# Patient Record
Sex: FEMALE | URBAN - METROPOLITAN AREA
[De-identification: names, ages, dates, MRNs, and addresses within clinical notes are randomized per-mention and may not be internally consistent; named-entity substitution may affect disease eponyms.]

---

## 2020-11-16 ENCOUNTER — OFFICE VISIT (OUTPATIENT)
Dept: URBAN - METROPOLITAN AREA CLINIC 27 | Facility: CLINIC | Age: 60
End: 2020-11-16
Payer: COMMERCIAL

## 2020-11-16 DIAGNOSIS — H43.813 VITREOUS DEGENERATION, BILATERAL: ICD-10-CM

## 2020-11-16 DIAGNOSIS — H33.311 HORSESHOE TEAR OF RETINA WITHOUT DETACHMENT, RIGHT EYE: ICD-10-CM

## 2020-11-16 DIAGNOSIS — Z96.1 PRESENCE OF INTRAOCULAR LENS: ICD-10-CM

## 2020-11-16 PROCEDURE — 92014 COMPRE OPH EXAM EST PT 1/>: CPT | Performed by: OPHTHALMOLOGY

## 2020-11-16 PROCEDURE — 92134 CPTRZ OPH DX IMG PST SGM RTA: CPT | Performed by: OPHTHALMOLOGY

## 2020-11-16 ASSESSMENT — INTRAOCULAR PRESSURE
OD: 14
OS: 16

## 2020-11-16 NOTE — IMPRESSION/PLAN
Impression: ERM OU Plan: Exam/OCT show a stable moderate ERM/LH OD, mild ERM/LH OS. VA stable, observe. 

6 months, OCT OU

## 2020-11-16 NOTE — IMPRESSION/PLAN
Impression: . Plan: Pt c/o visually significant floaters OD>OS following cataract Sx, affecting her ADLs and quality of life. Discussed RBA of observation vs PPV at length, pt will consider her options. Paperwork signed for 25g PPV OD.

## 2021-02-10 ENCOUNTER — SURGERY (OUTPATIENT)
Dept: URBAN - METROPOLITAN AREA EXTERNAL CLINIC 26 | Facility: EXTERNAL CLINIC | Age: 61
End: 2021-02-10
Payer: COMMERCIAL

## 2021-02-10 PROCEDURE — 67036 REMOVAL OF INNER EYE FLUID: CPT | Performed by: OPHTHALMOLOGY

## 2021-02-11 ENCOUNTER — POST-OPERATIVE VISIT (OUTPATIENT)
Dept: URBAN - METROPOLITAN AREA CLINIC 27 | Facility: CLINIC | Age: 61
End: 2021-02-11
Payer: COMMERCIAL

## 2021-02-11 DIAGNOSIS — H43.21 CRYSTALLINE DEPOSITS IN VITREOUS BODY, RIGHT EYE: Primary | ICD-10-CM

## 2021-02-11 PROCEDURE — 99024 POSTOP FOLLOW-UP VISIT: CPT | Performed by: OPHTHALMOLOGY

## 2021-02-11 ASSESSMENT — INTRAOCULAR PRESSURE
OS: 10
OD: 8

## 2021-02-11 NOTE — IMPRESSION/PLAN
Impression: S/P PPV OD x VH OD - 1 Day. 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  Plan: --retina attached
--no s/s infection
--IOP acceptable
--start PF/Oflox QID
--RD/endoph WS discussed
--f/u 1 week w/LORY

## 2021-02-22 ENCOUNTER — POST-OPERATIVE VISIT (OUTPATIENT)
Dept: URBAN - METROPOLITAN AREA CLINIC 27 | Facility: CLINIC | Age: 61
End: 2021-02-22
Payer: COMMERCIAL

## 2021-02-22 PROCEDURE — 99024 POSTOP FOLLOW-UP VISIT: CPT | Performed by: OPHTHALMOLOGY

## 2021-02-22 PROCEDURE — 99212 OFFICE O/P EST SF 10 MIN: CPT | Performed by: OPHTHALMOLOGY

## 2021-02-22 ASSESSMENT — INTRAOCULAR PRESSURE
OS: 14
OD: 17

## 2021-02-22 NOTE — IMPRESSION/PLAN
Impression: S/P PPV OD - 12 Days. Vitreous degeneration, bilateral  H43.813. Plan: OD is stable, taper Rx. Pt c/o visually significant floaters OS following cataract Sx, affecting her ADLs and quality of life.   Discussed RBA of observation vs PPV at length, pt would like to proceed with Sx. 

25g PPV OS

## 2021-02-24 ENCOUNTER — SURGERY (OUTPATIENT)
Dept: URBAN - METROPOLITAN AREA EXTERNAL CLINIC 26 | Facility: EXTERNAL CLINIC | Age: 61
End: 2021-02-24
Payer: COMMERCIAL

## 2021-02-24 PROCEDURE — 67036 REMOVAL OF INNER EYE FLUID: CPT | Performed by: OPHTHALMOLOGY

## 2021-02-25 ENCOUNTER — POST-OPERATIVE VISIT (OUTPATIENT)
Dept: URBAN - METROPOLITAN AREA CLINIC 27 | Facility: CLINIC | Age: 61
End: 2021-02-25
Payer: COMMERCIAL

## 2021-02-25 PROCEDURE — 99024 POSTOP FOLLOW-UP VISIT: CPT | Performed by: OPHTHALMOLOGY

## 2021-02-25 ASSESSMENT — INTRAOCULAR PRESSURE
OD: 18
OS: 14

## 2021-02-25 NOTE — IMPRESSION/PLAN
Impression: S/P 25g PPV OS - 1 Day. Vitreous degeneration, bilateral  H43.813.  Plan: --retina attached
--no s/s infection
--IOP acceptable
--start PF/Oflox QID
--RD/endoph WS discussed
--f/u 1 week w/LORY

## 2021-03-08 ENCOUNTER — POST-OPERATIVE VISIT (OUTPATIENT)
Dept: URBAN - METROPOLITAN AREA CLINIC 27 | Facility: CLINIC | Age: 61
End: 2021-03-08
Payer: COMMERCIAL

## 2021-03-08 PROCEDURE — 99024 POSTOP FOLLOW-UP VISIT: CPT | Performed by: OPHTHALMOLOGY

## 2021-03-08 ASSESSMENT — INTRAOCULAR PRESSURE
OD: 17
OS: 19

## 2021-03-08 NOTE — IMPRESSION/PLAN
Impression: S/P 25g PPV OS - 12 Days. Vitreous degeneration, bilateral  H43.813. Plan: Doing well, pt very happy. 

4-6 weeks

## 2021-04-10 ENCOUNTER — POST-OPERATIVE VISIT (OUTPATIENT)
Dept: URBAN - METROPOLITAN AREA CLINIC 13 | Facility: CLINIC | Age: 61
End: 2021-04-10
Payer: COMMERCIAL

## 2021-04-10 PROCEDURE — 99024 POSTOP FOLLOW-UP VISIT: CPT | Performed by: OPHTHALMOLOGY

## 2021-04-10 ASSESSMENT — INTRAOCULAR PRESSURE
OS: 13
OD: 7

## 2021-04-10 NOTE — IMPRESSION/PLAN
Impression: 1) S/P 25g PPV OS - 45 Days. Vitreous degeneration, bilateral  H43.813.
2) Iritis OS- likely rebound; +PEE, but no evidence of corneal ulcer or HSV infection Plan: 1) Doing well 2) Will start PF q2hr while awake. 
Follow up on Monday as prev scheduled with [facility]

## 2021-04-12 ENCOUNTER — POST-OPERATIVE VISIT (OUTPATIENT)
Dept: URBAN - METROPOLITAN AREA CLINIC 27 | Facility: CLINIC | Age: 61
End: 2021-04-12
Payer: COMMERCIAL

## 2021-04-12 DIAGNOSIS — H20.012 PRIMARY IRIDOCYCLITIS, LEFT EYE: Primary | ICD-10-CM

## 2021-04-12 DIAGNOSIS — H35.373 PUCKERING OF MACULA, BILATERAL: ICD-10-CM

## 2021-04-12 PROCEDURE — 92134 CPTRZ OPH DX IMG PST SGM RTA: CPT | Performed by: OPHTHALMOLOGY

## 2021-04-12 PROCEDURE — 99212 OFFICE O/P EST SF 10 MIN: CPT | Performed by: OPHTHALMOLOGY

## 2021-04-12 ASSESSMENT — INTRAOCULAR PRESSURE
OD: 8
OS: 9

## 2021-04-12 NOTE — IMPRESSION/PLAN
Impression: S/P 25g PPV OS - 47 Days. Vitreous degeneration, bilateral  H43.813. S/P PPV  OD  02/10/2021 Plan: Pt feels much better, but pt still has AC cell. Cont PF qh for now. 

1 week

## 2021-04-19 ENCOUNTER — POST-OPERATIVE VISIT (OUTPATIENT)
Dept: URBAN - METROPOLITAN AREA CLINIC 27 | Facility: CLINIC | Age: 61
End: 2021-04-19
Payer: COMMERCIAL

## 2021-04-19 PROCEDURE — 99024 POSTOP FOLLOW-UP VISIT: CPT | Performed by: OPHTHALMOLOGY

## 2021-04-19 ASSESSMENT — INTRAOCULAR PRESSURE
OS: 20
OD: 19

## 2021-04-19 NOTE — IMPRESSION/PLAN
Impression: S/P 25g PPV 54 Days. OS 
S/P PPV  OD  02/10/2021- Vitreous degeneration, bilateral  H43.813. Plan: Rare cell OS. Eye feels much better. Reduce PF to QID. 

1 week

## 2021-04-27 ENCOUNTER — POST-OPERATIVE VISIT (OUTPATIENT)
Dept: URBAN - METROPOLITAN AREA CLINIC 27 | Facility: CLINIC | Age: 61
End: 2021-04-27
Payer: COMMERCIAL

## 2021-04-27 PROCEDURE — 99024 POSTOP FOLLOW-UP VISIT: CPT | Performed by: OPHTHALMOLOGY

## 2021-04-27 ASSESSMENT — INTRAOCULAR PRESSURE
OD: 13
OS: 13

## 2021-04-27 NOTE — IMPRESSION/PLAN
Impression: S/P 25g PPV OS - 62 Days. Vitreous degeneration, bilateral  H43.813. Plan: Pt feeling better. Still with rare cell. Reduce PF to TID and follow closely. 

1 week

## 2021-05-03 ENCOUNTER — POST-OPERATIVE VISIT (OUTPATIENT)
Dept: URBAN - METROPOLITAN AREA CLINIC 27 | Facility: CLINIC | Age: 61
End: 2021-05-03
Payer: COMMERCIAL

## 2021-05-03 PROCEDURE — 99024 POSTOP FOLLOW-UP VISIT: CPT | Performed by: OPHTHALMOLOGY

## 2021-05-03 ASSESSMENT — INTRAOCULAR PRESSURE
OD: 13
OS: 15

## 2021-05-03 NOTE — IMPRESSION/PLAN
Impression: S/P 25g PPV OS - 68 Days. Vitreous degeneration, bilateral  H43.813. Plan: Pt feeling better. Still with rare cell. Reduce PF to BID x1 week, then QD x4 days, then stop. 

2 weeks

## 2021-05-17 ENCOUNTER — POST-OPERATIVE VISIT (OUTPATIENT)
Dept: URBAN - METROPOLITAN AREA CLINIC 27 | Facility: CLINIC | Age: 61
End: 2021-05-17
Payer: COMMERCIAL

## 2021-05-17 PROCEDURE — 99024 POSTOP FOLLOW-UP VISIT: CPT | Performed by: OPHTHALMOLOGY

## 2021-05-17 ASSESSMENT — INTRAOCULAR PRESSURE
OD: 17
OS: 14

## 2021-05-17 NOTE — IMPRESSION/PLAN
Impression: S/P 25g PPV OS - 82 Days. Vitreous degeneration, bilateral  H43.813. Plan: Pt feeling better, AC cell resolved. Reduce PF to QOD x1 week, then stop. 

4-6 months, OCT OU